# Patient Record
Sex: FEMALE | Race: WHITE | HISPANIC OR LATINO | Employment: OTHER | URBAN - METROPOLITAN AREA
[De-identification: names, ages, dates, MRNs, and addresses within clinical notes are randomized per-mention and may not be internally consistent; named-entity substitution may affect disease eponyms.]

---

## 2017-01-17 ENCOUNTER — TELEPHONE (OUTPATIENT)
Dept: NEUROLOGY | Facility: CLINIC | Age: 37
End: 2017-01-17

## 2017-01-17 NOTE — TELEPHONE ENCOUNTER
----- Message from Marc Tyler sent at 1/17/2017  2:57 PM CST -----  Contact: Self [phone]  Patient is calling to find out if a appt is available later in the day, pls call

## 2017-04-27 ENCOUNTER — OFFICE VISIT (OUTPATIENT)
Dept: NEUROLOGY | Facility: CLINIC | Age: 37
End: 2017-04-27
Payer: MEDICARE

## 2017-04-27 ENCOUNTER — LAB VISIT (OUTPATIENT)
Dept: LAB | Facility: HOSPITAL | Age: 37
End: 2017-04-27
Attending: PSYCHIATRY & NEUROLOGY
Payer: MEDICARE

## 2017-04-27 VITALS
DIASTOLIC BLOOD PRESSURE: 83 MMHG | WEIGHT: 119.06 LBS | HEART RATE: 105 BPM | HEIGHT: 59 IN | SYSTOLIC BLOOD PRESSURE: 120 MMHG | BODY MASS INDEX: 24 KG/M2

## 2017-04-27 DIAGNOSIS — F31.10 BIPOLAR AFFECTIVE DISORDER, CURRENT EPISODE MANIC WITHOUT PSYCHOTIC SYMPTOMS: Chronic | ICD-10-CM

## 2017-04-27 DIAGNOSIS — G25.9 MOVEMENT DISORDER: Primary | Chronic | ICD-10-CM

## 2017-04-27 DIAGNOSIS — G20.A1 PARKINSON'S DISEASE: ICD-10-CM

## 2017-04-27 DIAGNOSIS — M13.0 POLYARTHRITIS: ICD-10-CM

## 2017-04-27 DIAGNOSIS — G25.9 MOVEMENT DISORDER: Chronic | ICD-10-CM

## 2017-04-27 LAB
CERULOPLASMIN SERPL-MCNC: 28 MG/DL
LITHIUM SERPL-SCNC: 0.6 MMOL/L
TSH SERPL DL<=0.005 MIU/L-ACNC: 1.35 UIU/ML

## 2017-04-27 PROCEDURE — 82164 ANGIOTENSIN I ENZYME TEST: CPT

## 2017-04-27 PROCEDURE — 36415 COLL VENOUS BLD VENIPUNCTURE: CPT

## 2017-04-27 PROCEDURE — 1160F RVW MEDS BY RX/DR IN RCRD: CPT | Mod: S$GLB,,, | Performed by: PSYCHIATRY & NEUROLOGY

## 2017-04-27 PROCEDURE — 99204 OFFICE O/P NEW MOD 45 MIN: CPT | Mod: S$GLB,,, | Performed by: PSYCHIATRY & NEUROLOGY

## 2017-04-27 PROCEDURE — 86341 ISLET CELL ANTIBODY: CPT

## 2017-04-27 PROCEDURE — 82390 ASSAY OF CERULOPLASMIN: CPT

## 2017-04-27 PROCEDURE — 84443 ASSAY THYROID STIM HORMONE: CPT

## 2017-04-27 PROCEDURE — 80178 ASSAY OF LITHIUM: CPT

## 2017-04-27 PROCEDURE — 86038 ANTINUCLEAR ANTIBODIES: CPT

## 2017-04-27 PROCEDURE — 99999 PR PBB SHADOW E&M-EST. PATIENT-LVL III: CPT | Mod: PBBFAC,,, | Performed by: PSYCHIATRY & NEUROLOGY

## 2017-04-27 RX ORDER — TRAZODONE HYDROCHLORIDE 100 MG/1
100 TABLET ORAL NIGHTLY
COMMUNITY

## 2017-04-27 RX ORDER — ALPRAZOLAM 1 MG/1
1 TABLET ORAL 3 TIMES DAILY PRN
COMMUNITY
End: 2020-12-07

## 2017-04-27 RX ORDER — CARBIDOPA AND LEVODOPA 50; 200 MG/1; MG/1
50-200 TABLET, EXTENDED RELEASE ORAL 3 TIMES DAILY
COMMUNITY
Start: 2017-04-14

## 2017-04-27 RX ORDER — LITHIUM CARBONATE 150 MG/1
350 CAPSULE ORAL DAILY
COMMUNITY
Start: 2017-04-26

## 2017-04-27 RX ORDER — TIZANIDINE 4 MG/1
4 TABLET ORAL 2 TIMES DAILY
COMMUNITY

## 2017-04-27 RX ORDER — ZOLPIDEM TARTRATE 10 MG/1
10 TABLET ORAL NIGHTLY
COMMUNITY
Start: 2017-04-26

## 2017-04-27 RX ORDER — DULOXETIN HYDROCHLORIDE 30 MG/1
30 CAPSULE, DELAYED RELEASE ORAL DAILY
COMMUNITY
Start: 2017-04-26

## 2017-04-27 RX ORDER — BACLOFEN 10 MG/1
10 TABLET ORAL DAILY
COMMUNITY

## 2017-04-27 NOTE — ASSESSMENT & PLAN NOTE
"Exam was challenging today as she had hyperkinetic movements of upper body and marked dystonia of bilateral legs.  Dystonia component consistent with young onset PD but variable dyskinetic movements concerning for alternate etiology such as SCA2, Wilber's, Fahrs, Gina's (or just levodopa effect).  Has clear "on" and "off" time, as reported by she and partner.   -> Check lab workup as below   -> Return to clinic for off/On exam   -> Pt to bring CD of brain MRI for review next visit     "

## 2017-04-27 NOTE — MR AVS SNAPSHOT
[facility]  [address]  [facility] [address]  Phone: [phone]  Fax: [phone]                  Constance Alejandro   2017 3:00 PM   Office Visit    Description:  Female : 1980   Provider:  Jessi Winkler MD   Department:  [facility]           Diagnoses this Visit        Comments    Movement disorder    -  Primary     Bipolar affective disorder, current episode manic without psychotic symptoms         Polyarthritis                To Do List           Goals (5 Years of Data)     None      Follow-Up and Disposition     Return in 5 weeks (on 2017) for at 11:20am.      [facility]     [facility] -  Assistance  Unless otherwise directed by your provider, please contact [facility], our nurse care line that is available for  assistance.     Registered nurses in the [facility] provide: appointment scheduling, clinical advisement, health education, and other advisory services.  Call: [phone] (toll free)               Medications           Message regarding Medications     Verify the changes and/or additions to your medication regime listed below are the same as discussed with your clinician today.  If any of these changes or additions are incorrect, please notify your healthcare provider.             Verify that the below list of medications is an accurate representation of the medications you are currently taking.  If none reported, the list may be blank. If incorrect, please contact your healthcare provider. Carry this list with you in case of emergency.           Current Medications     alprazolam (XANAX) 1 MG tablet Take 1 mg by mouth 3 (three) times daily as needed for Anxiety.    baclofen (LIORESAL) 10 MG tablet Take 10 mg by mouth once daily.    carbidopa-levodopa  mg (SINEMET CR)  mg TbSR Take  tablets by mouth 3 (three) times daily.    duloxetine (CYMBALTA) 30 MG capsule Take  "30 mg by mouth once daily.    lithium carbonate 150 MG capsule Take 350 mg by mouth once daily.    tizanidine (ZANAFLEX) 4 MG tablet Take 4 mg by mouth 2 (two) times daily.    trazodone (DESYREL) 100 MG tablet Take 100 mg by mouth every evening. 2 tablets at night    zolpidem (AMBIEN) 10 mg Tab Take 10 mg by mouth every evening.           Clinical Reference Information           Your Vitals Were     BP Pulse Height Weight BMI    120/83 105 4' 11" (1.499 m) 54 kg (119 lb 0.8 oz) 24.04 kg/m2      Blood Pressure          Most Recent Value    BP  120/83      Allergies as of 4/27/2017     No Known Allergies      Immunizations Administered on Date of Encounter - 4/27/2017     None      Orders Placed During Today's Visit     Future Labs/Procedures Expected by Expires    LAVELLE  4/27/2017 6/26/2018    ANGIOTENSIN CONVERTING ENZYME  4/27/2017 6/26/2018    CERULOPLASMIN  4/27/2017 6/26/2018    GLUTAMIC ACID DECARBOXYLASE  4/27/2017 6/26/2018    LITHIUM LEVEL  4/27/2017 6/26/2018    TSH  4/27/2017 6/26/2018      [facility] Sign-Up     Activating your [facility] account is as easy as 1-2-3!     1) Visit [email or web address], select Sign Up Now, enter this activation code and your date of birth, then select Next.  [email or web address]  Expires: 6/11/2017  4:46 PM      2) Create a username and password to use when you visit [facility] in the future and select a security question in case you lose your password and select Next.    3) Enter your e-mail address and click Sign Up!    Additional Information  If you have questions, please e-mail [email or web address] or call [phone] to talk to our [facility] staff. Remember, [facility] is NOT to be used for urgent needs. For medical emergencies, dial 911.         Instructions    1.  Please get the imaging of your brain (MRI) from [facility] in [place].  I need the disc to review (not just report).    2.  When you return 5/31, please do not take your sinemet that morning!  " Bring it with you as you'll take it in the clinic.  You can take everything else that morning that you normally take.       Smoking Cessation     If you would like to quit smoking:   You may be eligible for free services if you are a [place] resident and started smoking cigarettes before September 1, 1988.  Call the Smoking Cessation Trust (SCT) toll free at (946) 161-8238 or (105) 214-2491.   Call 1-800-QUIT-NOW if you do not meet the above criteria.   Contact us via email: [email or web address]   View our website for more information: [email or web address]        Language Assistance Services     ATTENTION: Language assistance services are available, free of charge. Please call [phone].      ATENCIÓN: Si lanala anjel, tiene a snowden disposición servicios gratuitos de asistencia lingüística. Llame al [phone].     CHÚ Ý: N?u b?n nói Ti?ng Vi?t, có các d?ch v? h? tr? ngôn ng? mi?n phí dành cho b?n. G?i s? [phone].         [facility] complies with applicable Federal civil rights laws and does not discriminate on the basis of race, color, national origin, age, disability, or sex.

## 2017-04-27 NOTE — PATIENT INSTRUCTIONS
1.  Please get the imaging of your brain (MRI) from [facility] in [place].  I need the disc to review (not just report).    2.  When you return 5/31, please do not take your sinemet that morning!  Bring it with you as you'll take it in the clinic.  You can take everything else that morning that you normally take.

## 2017-04-27 NOTE — PROGRESS NOTES
Constance SERRA Chief Complaints during this visit:  New Patient visit for  Evaluation of PD  HALINA Monson MD  [address] [facility] [address] [place] [address]    Primary Care Physician  Sherri Monson MD  [address] [facility] [address]        History of present illness:  36 y.o.  female seen in consultation at the request of  Dr. Carvalho for evaluation of a movement disorder.   Accompanied by her partner of over 20 years, Chad.  [place], [place]5 [place] away). She was referred here by her neurologist for Movement specialist evaluation.  [facility] would not take [employer].    Pt states 6 years ago, she lost 30 pounds, had significant muscle atrophy and difficulty walking. She had significant pain in her hips and legs. She was in a walker for most of the last 6 years with narcotic pain medications. She states she had multiple tests without positive results. She started going to an outside neurologist due to tremor, and she later wanted to wean narcotics to try and find out what was going on. After she had been off of them for several months, she began having severe symptoms, including dyskinesia, waist and neck dystonia, also in the feet, stuttering. Couldn't drive, cook or take care of children. Dr. Monson's nurse started her on mirapex which helped her symptoms It was determined that she had early onset Parkinson's disease.     She also has occular dystonia as well as a resting tremor, mostly on the left side. She also has trouble walking. She has graduated from her walker now for 10 months now. She has trouble with attention and forgetfulness as well.     She is currently off of Mirapex.    Alprazolam 1mg three times daily   Tizanidine 4mg two times per day - in the morning and at night  Trazadone 200mg at night  Baclofen 10mg once per day   Carbidopa/levodopa 50/200mg three times per day, she has  "been on this since last summer.    Docusate 100mg   Melatonin 10mg every night     Pt states that she can drive now, after taking the CD/LD, helps with dyskinesia, and dystonia but not the dystonia in her feet. She states she gets constipation from it and acne. Lasts 6-7 hours sometimes other times barely 4.  Chad says he notices she gets more "glitchy" when she wears off and that on she can at times appear almost normal.     She also has poor appetite.     Dyskinesia she describes as jumping around type movements on the left side of her body.     Took CD/LD last at 1:00 today     II.  Review of systems    Hyposmia (HENT)?sometimes doesnt smell at all, other times smells things okay or smells someting that  isnt there  RBD/sleep issues (Constitutional)?Has vivid dreams but does not act them out   Depression/anxiety (Psychiatric)?PT with bipolar disorder   Fatigue (Constitutional)?Yes  Constipation (GI)?Yes - chronic, goes once or twice per week   Urinary issues ()?No  Sexual dysfunction ()?N/A  Orthostasis (Cardiovascular)?No  Leg swelling (Cardiovascular)? No  Falls (Musculoskeletal)?Yes, last fall was couple weeks ago - has some difficulty coordinating steps   Cognitive impairment (Neurologic)?some, attention and remembering routines such as eating   Psychoses (Psychiatric)?No  Pain/Paresthesia (Neurologic)?Yes  - feet and legs   Visual changes (Eyes)?Yes - better since she started levodopa   Moles / skin changes (Skin)?Acne   Stridor / SOB (Pulm)?Yes - only happens when she doesn't take Xannax   Bruising (Heme)?Yes      III.  Past Medical History:   Diagnosis Date    Bipolar affective disorder, current episode manic without psychotic symptoms 4/27/2009    - Previously followed by Dr. Parrish out of [place], now without a psychiatrist     Movement disorder 4/27/2017    Polyarthritis 4/27/2017     Family History   Problem Relation Age of Onset    Hypertension Mother     Depression Mother     " "Diabetes Father      s/p renal transplant     Hypertension Father     Depression Father     Alzheimer's disease Maternal Grandmother         Mother - has significant mood changes  Both parents with depression     Brother 34 - ptsd    Sister, 40 no medical problems, possibly anorexic      Son - 10 years old with anxiety - pt suspicion with bipolar disorder as well    Maternal grandmother had Alzheimer's     Social History     Social History    Marital status: Single     Spouse name: N/A    Number of children: N/A    Years of education: N/A     Social History Main Topics    Smoking status: Current Every Day Smoker     Types: Cigarettes    Smokeless tobacco: None      Comment: just under 1/2 pack a day    Alcohol use Yes      Comment: very rarely     Drug use: Yes    Sexual activity: Not Asked     Other Topics Concern    None     Social History Narrative    None     Alprazolam 1mg three times daily   Tizanidine 4mg two times per day - in the morning and at night  Trazadone 200mg at night  Baclofen 10mg once per day   Carbidopa/levodopa 50/200mg three times per day, she has been on this since last summer.    Docusate 100mg   Melatonin 10mg every night  Lithium 450mg daily  Zolpidem 10mg QHS   Duloxetine 30mg daily     PRIOR problem-specific medications tried:      Carbidopa/levodopa - still on   Mirapex: started levodopa in place     Review of patient's allergies indicates:  No Known Allergies    IV. Physical Exam    Vitals:    04/27/17 1513   BP: 120/83   Pulse: 105   Weight: 54 kg (119 lb 0.8 oz)   Height: 4' 11" (1.499 m)     General appearance: Well nourished, well developed, no acute distress.     HEENT: NC/AT, perioral acneiform rash, MMM, normal pharynx          -------------------------------------------------------------  Facial Expression: normal       Affect: full       Orientation to time & place:  Oriented to time, place, person and situation       Attention & concentration:  Normal attention " "span and concentration WORLD backwards: DROW       Language: Spontaneous, fluent; able to repeat and name objects        Fund of knowledge:  Aware of current events        Speech:  mildy speech dysfluency, no dysarthria   -------------------------------------------------------  Cranial nerves: pupils equal round and reactive, extraocular movements intact, infrequently blinks to initiate saccades facial sensation intact, face symmetrical, hearing intact to whisper, palate raises midline, shoulder shrug strength normal, tongue protrudes midline.        -------------------------------------------------------  Musculoskeletal  Muscle tone: Increased in neck and upper extremities         Muscle Bulk: all 4 extremities normal        Muscle strength:  5/5 in all 4 extremities        Sensation: Intact to light touch, pin prick, vibration in all extremities        Deep tendon Reflexes: 3+ bilateral biceps, triceps, patella and ankles, + Jaw jerk      --------------------------------------------------------------  Cerebellar and Coordination  Gait:  Spastic, toe walks with reduced arm swing       Finger-nose: no dysmetria       Rapid Alternating Movements (pronation/supination):  Apraxic  --------------------------------------------------------------  MOVEMENT DISORDERS FOCUSED EXAM  Abnormality of movement (bradykinesia, hyperkinesia) present? Yes    No motor imperisistence   Tremor present?   Yes   Posture:  normal    UPDRS Motor Examination      Speech  0 - Normal.   Facial Expression  1 - Minimal Hypomimia, could be normal "Poker Face".   Rigidity     Neck 2 - Mild or moderate.   Upper Extremity: Right 3 - Marked, but full range of motion easily achieved.   Upper Extremity: Left 3 - Marked, but full range of motion easily achieved.   Lower Extremity: Right 0 - Absent.   Lower Extremity: Left 0 - Absent.     Finger Taps      right 2 - Moderately impaired. Definite and early fatiguing. May have occasional arrests in " movement.   left 2 - Moderately impaired. Definite and early fatiguing. May have occasional arrests in movement.   Hand Movements      right 2 - Moderately impaired. Definite and early fatiguing. May have occasional arrests in movement.   left 2 - Moderately impaired. Definite and early fatiguing. May have occasional arrests in movement.   Pronation/supination of Hands      right 3 - Severely impaired. Frequent hesitation in initiating movements or arrests in ongoing movement.   left 3 - Severely impaired. Frequent hesitation in initiating movements or arrests in ongoing movement.     Toe Tapping    right 1 - Mild slowing and/or reduction in amplitude.   left 2 - Moderately impaired. Definite and early fatiguing. May have occasional arrests in movement.     Leg Agility      right 2 - Moderately impaired. Definite and early fatiguing. May have occasional arrests in movement.   left 2 - Moderately impaired. Definite and early fatiguing. May have occasional arrests in movement.   Arising from Chair  1 - Slow; or may need more than one attempt.   Posture  0 - Normal erect.   Gait  1 - Walks slowly, may shuffle with short steps, but no festination (hastening steps) or propulsion.   Freezing of gait 0   Postural Stability (Response to sudden, strong posterior displacement produced by pull on shoulders while patient erect with eyes open and feet slightly apart.   Deferred    Body Bradykinesia and Hypokinesia (Combining slowness, hesitancy, decreased armswing, small amplitude, and poverty of movement in general)  2 - Mild degree of slowness and poverty of movement which is definitely abnormal.     Tremor at Rest:      Face, lips, chin 0 - Absent.    Hands:      right 0 - Absent.    left 0 - Absent.    Feet:     right 0 - Absent.    left 0 - Absent.    Constancy of REST tremor: 0   Postural tremor:    right 1 - Slight and infrequently present.    left 1 - Slight and infrequently present.    Kinetic tremor:    right 1 -  "Slight and infrequently present.    left 1 - Slight and infrequently present.    Dyskinesias present? Dyskinetic, behavioral rocking with fast tic like jerking        Total Motor UPDRS:  38        V.  Laboratory/ Radiological Data:   No hard images to review       Outside records reviewed.  Mri 3/2016 normal    VI.     Problem List Items Addressed This Visit        1 - High    Parkinson's disease - Primary    Overview     YOPD diagnosed by outside neurologist, predominately lower body dystonia, responsive to levodopa.         Current Assessment & Plan     Exam was challenging today as she had hyperkinetic movements of upper body and marked dystonia of bilateral legs.  Dystonia component consistent with young onset PD but variable dyskinetic movements concerning for alternate etiology such as SCA2, Wilber's, Fahrs, Wallace's (or just levodopa effect).  Has clear "on" and "off" time, as reported by she and partner.   -> Check lab workup as below   -> Return to clinic for off/On exam   -> Pt to bring CD of brain MRI for review next visit               2     Bipolar affective disorder, current episode manic without psychotic symptoms (Chronic)    Overview     - Previously followed by Dr. Parrish out of [place], now without a psychiatrist          Relevant Orders    LITHIUM LEVEL       3     Polyarthritis    Overview     Followed by primary care physcician                         Tests ordered during this visit:   Orders Placed This Encounter   Procedures    LITHIUM LEVEL    CERULOPLASMIN    LAVELLE    TSH    ANGIOTENSIN CONVERTING ENZYME    GLUTAMIC ACID DECARBOXYLASE         I appreciate the opportunity to participate in the care of this patient and will communicate my assessment and plan back to the referring physician via copy of this note.                             Return in 5 weeks (on 5/31/2017) for at 11:20am.     Pt interviewed and examined with Dr. Cathi Marie MD  Movement " Disorders Fellow  [facility]      See Fellow's note.  We saw the patient together, I examined individually, and we discussed the assessment and plan as she documented (and I edited) in her note.

## 2017-04-27 NOTE — LETTER
April 27, 2017      HALINA Monson MD  [address] [facility] [address]           [facility]  [address]  [facility] [address]  Phone: [phone]  Fax: [phone]          Patient: Constance Alejandro   MR Number: [id number]   YOB: 1980   Date of Visit: 4/27/2017       Dear Dr. HALINA Monson:    Thank you for referring Constance Alejandro to me for evaluation. Attached you will find relevant portions of my assessment and plan of care.    If you have questions, please do not hesitate to call me. I look forward to following Constance Alejandro along with you.    Sincerely,    Jessi Winkler MD    Enclosure  CC:  No Recipients    If you would like to receive this communication electronically, please contact [email or web address] or [phone] to request more information on [employer] Link access.    For providers and/or their staff who would like to refer a patient to [facility], please contact us through our one-stop-shop provider referral line, [facility], at [phone].    If you feel you have received this communication in error or would no longer like to receive these types of communications, please e-mail [email or web address]

## 2017-04-28 LAB
ACE SERPL-CCNC: 26 U/L
ANA SER QL IF: NORMAL

## 2017-04-30 LAB — GAD65 AB SER-SCNC: 0 NMOL/L

## 2017-05-31 ENCOUNTER — OFFICE VISIT (OUTPATIENT)
Dept: NEUROLOGY | Facility: CLINIC | Age: 37
End: 2017-05-31
Payer: MEDICARE

## 2017-05-31 VITALS
HEIGHT: 59 IN | DIASTOLIC BLOOD PRESSURE: 90 MMHG | SYSTOLIC BLOOD PRESSURE: 135 MMHG | BODY MASS INDEX: 23.56 KG/M2 | WEIGHT: 116.88 LBS | HEART RATE: 107 BPM

## 2017-05-31 DIAGNOSIS — G24.9 DYSTONIA: ICD-10-CM

## 2017-05-31 DIAGNOSIS — R25.9 ABNORMAL INVOLUNTARY MOVEMENT: ICD-10-CM

## 2017-05-31 PROCEDURE — 99214 OFFICE O/P EST MOD 30 MIN: CPT | Mod: S$GLB,,, | Performed by: PSYCHIATRY & NEUROLOGY

## 2017-05-31 PROCEDURE — 99999 PR PBB SHADOW E&M-EST. PATIENT-LVL III: CPT | Mod: PBBFAC,,, | Performed by: PSYCHIATRY & NEUROLOGY

## 2017-05-31 NOTE — PROGRESS NOTES
Constance SERRA Chief Complaints during this visit:  f/u Patient visit for PD  HALINA Monson MD  [address] [facility]  [place] [address]    Primary Care Physician  Sherri Monson MD  [address] [facility] [address]        History of present illness:  36 y.o.  W seen in f/u for possible PD, off meds today.  Accompanied by Chad.      Initial consult 4/27/17:  ...consultation at the request of  Dr. Carvalho for evaluation of a movement disorder.   Accompanied by her partner of over 20 years, Chad.  [place], [place]5 [place] away). She was referred here by her neurologist for Movement specialist evaluation.  [facility] would not take [employer].  Pt states 6 years ago, she lost 30 pounds, had significant muscle atrophy and difficulty walking. She had significant pain in her hips and legs. She was in a walker for most of the last 6 years with narcotic pain medications. She states she had multiple tests without positive results. She started going to an outside neurologist due to tremor, and she later wanted to wean narcotics to try and find out what was going on. After she had been off of them for several months, she began having severe symptoms, including dyskinesia, waist and neck dystonia, also in the feet, stuttering. Couldn't drive, cook or take care of children. Dr. Monson's nurse started her on mirapex which helped her symptoms It was determined that she had early onset Parkinson's disease.   She also has occular dystonia as well as a resting tremor, mostly on the left side. She also has trouble walking. She has graduated from her walker now for 10 months now. She has trouble with attention and forgetfulness as well.   She is currently off of Mirapex.  Alprazolam 1mg three times daily   Tizanidine 4mg two times per day - in the morning and at night  Trazadone 200mg at night  Baclofen 10mg once per  "day   Carbidopa/levodopa 50/200mg three times per day, she has been on this since last summer.    Docusate 100mg   Melatonin 10mg every night   Pt states that she can drive now, after taking the CD/LD, helps with dyskinesia, and dystonia but not the dystonia in her feet. She states she gets constipation from it and acne. Lasts 6-7 hours sometimes other times barely 4.  Chad says he notices she gets more "glitchy" when she wears off and that on she can at times appear almost normal.   She also has poor appetite.   Dyskinesia she describes as jumping around type movements on the left side of her body.   Took CD/LD last at 1:00 today     II.  Review of systems  As in HPI; balance 3 systems reviewed and are negative.      III.  Past Medical History:   Diagnosis Date    Bipolar affective disorder, current episode manic without psychotic symptoms 4/27/2009    - Previously followed by Dr. Parrish out of [place], now without a psychiatrist     Movement disorder 4/27/2017    Polyarthritis 4/27/2017     Family History   Problem Relation Age of Onset    Hypertension Mother     Depression Mother     Diabetes Father      s/p renal transplant     Hypertension Father     Depression Father     Alzheimer's disease Maternal Grandmother         Mother - has significant mood changes  Both parents with depression     Brother 34 - ptsd    Sister, 40 no medical problems, possibly anorexic      Son - 10 years old with anxiety - pt suspicion with bipolar disorder as well    Maternal grandmother had Alzheimer's     Social History     Social History    Marital status: Single     Spouse name: N/A    Number of children: N/A    Years of education: N/A     Social History Main Topics    Smoking status: Current Every Day Smoker     Types: Cigarettes    Smokeless tobacco: None      Comment: just under 1/2 pack a day    Alcohol use Yes      Comment: very rarely     Drug use:     Sexual activity: Not Asked     Other Topics " "Concern    None     Social History Narrative    None     Alprazolam 1mg three times daily   Tizanidine 4mg two times per day - in the morning and at night  Trazadone 200mg at night  Baclofen 10mg once per day   Carbidopa/levodopa 50/200mg three times per day, she has been on this since last summer.    Docusate 100mg   Melatonin 10mg every night  Lithium 450mg daily  Zolpidem 10mg QHS   Duloxetine 30mg daily     PRIOR problem-specific medications tried:      Carbidopa/levodopa - still on   Mirapex: started levodopa in place     Review of patient's allergies indicates:  No Known Allergies    IV. Physical Exam    Vitals:    05/31/17 1056   BP: (!) 135/90   Pulse: 107   Weight: 53 kg (116 lb 13.5 oz)   Height: 4' 11" (1.499 m)     General appearance: Well nourished, well developed, no acute distress.     HEENT: NC/AT, perioral acneiform rash, MMM, normal pharynx          -------------------------------------------------------------  Facial Expression: normal       Affect: full       Orientation to time & place:  Oriented to time, place, person and situation       Attention & concentration:  Normal attention span and concentration WORLD backwards: DROW       Language: Spontaneous, fluent; able to repeat and name objects        Fund of knowledge:  Aware of current events        Speech:  mildy speech dysfluency, no dysarthria   -------------------------------------------------------  Cranial nerves: pupils equal round and reactive, extraocular movements intact, infrequently blinks to initiate saccades when testing, but normal during interview, facial sensation intact, face symmetrical, hearing intact to whisper, palate raises midline, shoulder shrug strength normal, tongue protrudes midline.        -------------------------------------------------------  Musculoskeletal  Muscle tone: Increased in neck and upper extremities         Muscle Bulk: all 4 extremities normal        Muscle strength:  5/5 in all 4 extremities     "    Sensation: Intact to light touch in all extremities        Deep tendon Reflexes: 3+ bilateral biceps, triceps, patella and ankles    --------------------------------------------------------------  Cerebellar and Coordination  Gait:  Spastic, toe walks with reduced arm swing       Finger-nose: no dysmetria       Rapid Alternating Movements (pronation/supination):  Apraxic  --------------------------------------------------------------  MOVEMENT DISORDERS FOCUSED EXAM  Abnormality of movement (bradykinesia, hyperkinesia) present? Yes, dyskinetic, non-patterned movements of head, neck, shoulders, arms.  No motor imperisistence   Tremor present?   Yes, high frequency, low amplitude bilateral hands  Posture:  normal            V.  Laboratory/ Radiological Data:     Lab Visit on 04/27/2017   Component Date Value Ref Range Status    Lithium Lvl 04/27/2017 0.6  0.6 - 1.2 mmol/L Final    Ceruloplasmin 04/27/2017 28.0  15.0 - 45.0 mg/dL Final    LAVELLE Screen 04/28/2017 Negative <1:160  Negative <1:160 Final    TSH 04/27/2017 1.350  0.400 - 4.000 uIU/mL Final    Angio Convert Enzyme 04/28/2017 26  8 - 53 U/L Final    Comment: Test Performed by:  [facility] [address] [address]      Glutamic Acid Decarb Ab 04/30/2017 0.00  <=0.02 nmol/L Final    Comment: -------------------ADDITIONAL INFORMATION-------------------  This test was developed and its performance characteristics   determined by [facility] in a manner consistent with CLIA   requirements. This test has not been cleared or approved by   the U.S. Food and Drug Administration.  Test Performed by:  [address] [address]            Outside records reviewed.  Mri 3/2016 normal        VI.     Problem List Items Addressed This Visit        1 - High    Abnormal involuntary movement    Overview     Rocking of trunk, twisting of neck, not  patterned.         Current Assessment & Plan     She came today off of levodopa (>12 hours), so movements are not levodopa-induced dyskinesias.  Her movements today of upper body were most consistent with conversion disorder.  I suspect there may be multiple layers of pathology, including a dysregulation of dopamine centrally and, probably, other neurotransmitters.     -> I would like her psychologist to know that conversion disorder is one of her diagnosis, but patient declined.   -> ok to continue levodopa, though I would keep at lowest doses possible so she does not run into future problems.   -> I recommend getting Alok scan to evaluate for central dopamine pathology.              2     Dystonia    Overview     Predominately lower body dystonia, responsive to levodopa.         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           Other Visit Diagnoses    None.           No Follow-up on file.

## 2017-05-31 NOTE — LETTER
Ana Maria 3, 2017      HALINA Monson MD  [address] [facility] [address]           [facility]  [address]  [facility] [address]  Phone: [phone]  Fax: [phone]          Patient: Constance Alejandro   MR Number: [id number]   YOB: 1980   Date of Visit: 5/31/2017       Dear Dr. HALINA Monson:    Thank you for referring Constance Alejandro to me for evaluation. Attached you will find relevant portions of my assessment and plan of care.    If you have questions, please do not hesitate to call me. I look forward to following Constance Alejandro along with you.    Sincerely,        Enclosure  CC:  No Recipients    If you would like to receive this communication electronically, please contact [email or web address] or [phone] to request more information on [employer] Link access.    For providers and/or their staff who would like to refer a patient to [facility], please contact us through our one-stop-shop provider referral line, [facility] , at [phone].    If you feel you have received this communication in error or would no longer like to receive these types of communications, please e-mail [email or web address]

## 2017-05-31 NOTE — ASSESSMENT & PLAN NOTE
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

## 2017-05-31 NOTE — ASSESSMENT & PLAN NOTE
She came today off of levodopa (>12 hours), so movements are not levodopa-induced dyskinesias.  Her movements today of upper body were most consistent with conversion disorder.  I suspect there may be multiple layers of pathology, including a dysregulation of dopamine centrally and, probably, other neurotransmitters.     -> I would like her psychologist to know that conversion disorder is one of her diagnosis, but patient declined forwarding my note.   -> ok to continue levodopa, though I would keep at lowest doses possible so she does not run into future problems.   -> I recommend getting Alok scan to evaluate for central dopamine pathology.

## 2019-09-13 ENCOUNTER — OFFICE VISIT (OUTPATIENT)
Dept: FAMILY MEDICINE | Facility: CLINIC | Age: 39
End: 2019-09-13
Payer: MEDICARE

## 2019-09-13 VITALS
WEIGHT: 111.81 LBS | DIASTOLIC BLOOD PRESSURE: 92 MMHG | HEIGHT: 59 IN | SYSTOLIC BLOOD PRESSURE: 146 MMHG | RESPIRATION RATE: 16 BRPM | HEART RATE: 97 BPM | TEMPERATURE: 98 F | OXYGEN SATURATION: 98 % | BODY MASS INDEX: 22.54 KG/M2

## 2019-09-13 DIAGNOSIS — J06.9 UPPER RESPIRATORY TRACT INFECTION, UNSPECIFIED TYPE: Primary | ICD-10-CM

## 2019-09-13 DIAGNOSIS — R53.83 FATIGUE, UNSPECIFIED TYPE: ICD-10-CM

## 2019-09-13 PROCEDURE — 3008F PR BODY MASS INDEX (BMI) DOCUMENTED: ICD-10-PCS | Mod: CPTII,S$GLB,, | Performed by: NURSE PRACTITIONER

## 2019-09-13 PROCEDURE — 99213 OFFICE O/P EST LOW 20 MIN: CPT | Mod: 25,S$GLB,, | Performed by: NURSE PRACTITIONER

## 2019-09-13 PROCEDURE — 96372 THER/PROPH/DIAG INJ SC/IM: CPT | Mod: S$GLB,,, | Performed by: NURSE PRACTITIONER

## 2019-09-13 PROCEDURE — 96372 PR INJECTION,THERAP/PROPH/DIAG2ST, IM OR SUBCUT: ICD-10-PCS | Mod: S$GLB,,, | Performed by: NURSE PRACTITIONER

## 2019-09-13 PROCEDURE — 3008F BODY MASS INDEX DOCD: CPT | Mod: CPTII,S$GLB,, | Performed by: NURSE PRACTITIONER

## 2019-09-13 PROCEDURE — 99213 PR OFFICE/OUTPT VISIT, EST, LEVL III, 20-29 MIN: ICD-10-PCS | Mod: 25,S$GLB,, | Performed by: NURSE PRACTITIONER

## 2019-09-13 RX ORDER — LITHIUM CARBONATE 300 MG/1
2 CAPSULE ORAL DAILY
COMMUNITY

## 2019-09-13 RX ORDER — BROMPHENIRAMINE MALEATE, PSEUDOEPHEDRINE HYDROCHLORIDE, AND DEXTROMETHORPHAN HYDROBROMIDE 2; 30; 10 MG/5ML; MG/5ML; MG/5ML
10 SYRUP ORAL EVERY 4 HOURS PRN
Qty: 240 ML | Refills: 0 | Status: SHIPPED | OUTPATIENT
Start: 2019-09-13 | End: 2019-09-23

## 2019-09-13 RX ORDER — PROMETHAZINE HYDROCHLORIDE AND DEXTROMETHORPHAN HYDROBROMIDE 6.25; 15 MG/5ML; MG/5ML
5 SYRUP ORAL EVERY 4 HOURS PRN
Qty: 240 ML | Refills: 0 | Status: SHIPPED | OUTPATIENT
Start: 2019-09-13 | End: 2019-09-23

## 2019-09-13 RX ORDER — METHYLPREDNISOLONE 4 MG/1
TABLET ORAL
Qty: 21 TABLET | Refills: 0 | Status: SHIPPED | OUTPATIENT
Start: 2019-09-13 | End: 2020-12-07

## 2019-09-13 RX ORDER — CYANOCOBALAMIN 1000 UG/ML
100 INJECTION, SOLUTION INTRAMUSCULAR; SUBCUTANEOUS ONCE
Status: COMPLETED | OUTPATIENT
Start: 2019-09-13 | End: 2019-09-13

## 2019-09-13 RX ORDER — LEVOCETIRIZINE DIHYDROCHLORIDE 5 MG/1
5 TABLET, FILM COATED ORAL NIGHTLY
Qty: 14 TABLET | Refills: 0 | Status: SHIPPED | OUTPATIENT
Start: 2019-09-13 | End: 2020-12-07

## 2019-09-13 RX ADMIN — CYANOCOBALAMIN 100 MCG: 1000 INJECTION, SOLUTION INTRAMUSCULAR; SUBCUTANEOUS at 10:09

## 2019-09-13 NOTE — PROGRESS NOTES
Subjective:       Patient ID: Constance Alejandro is a 38 y.o. female.    Chief Complaint: Nasal Congestion (has been sick for 11 days. ); Cough (feels like she can not take a full breath); and Diarrhea (off and on. no fever. having trouble eating)    HPI     Cough/congestion x 12 days. Feels like she can't inhale deeply. Poor appetite w/ diarrhea. No fever that she is aware of. No pain in face or behind eyes. Pressure sensation in ears. No wheezing.     Review of Systems   Constitutional: Negative for activity change, appetite change, chills and fever.   HENT: Positive for congestion, postnasal drip and rhinorrhea. Negative for hearing loss, nosebleeds, sinus pressure, sore throat and trouble swallowing.    Respiratory: Positive for cough. Negative for chest tightness, shortness of breath and wheezing.    Cardiovascular: Negative for chest pain and palpitations.   Gastrointestinal: Negative for abdominal distention, abdominal pain, constipation, diarrhea, nausea and vomiting.   Genitourinary: Negative for difficulty urinating, flank pain, frequency, hematuria and urgency.   Musculoskeletal: Negative for arthralgias, back pain, joint swelling, neck pain and neck stiffness.   Skin: Negative for color change and pallor.   Neurological: Negative for dizziness, seizures, syncope, weakness, light-headedness and headaches.   Hematological: Negative for adenopathy. Does not bruise/bleed easily.   Psychiatric/Behavioral: Negative for agitation, behavioral problems, confusion and sleep disturbance. The patient is not nervous/anxious.        Objective:      Physical Exam   Constitutional: She is oriented to person, place, and time. She appears well-developed and well-nourished.   HENT:   Head: Normocephalic and atraumatic.   Right Ear: Hearing, tympanic membrane, external ear and ear canal normal.   Left Ear: Hearing, tympanic membrane, external ear and ear canal normal.   Nose: Mucosal edema and rhinorrhea present. Right sinus  exhibits no maxillary sinus tenderness and no frontal sinus tenderness. Left sinus exhibits no maxillary sinus tenderness and no frontal sinus tenderness.   Mouth/Throat: Mucous membranes are normal. Posterior oropharyngeal erythema present. No oropharyngeal exudate or posterior oropharyngeal edema.   Eyes: Pupils are equal, round, and reactive to light. Conjunctivae and EOM are normal. No scleral icterus.   Neck: Trachea normal and normal range of motion. Neck supple. Carotid bruit is not present. No thyroid mass present.   Cardiovascular: Normal rate, regular rhythm and normal heart sounds.   Pulmonary/Chest: Effort normal and breath sounds normal.   Abdominal: Soft. Bowel sounds are normal.   Musculoskeletal: Normal range of motion. She exhibits no edema.   Neurological: She is alert and oriented to person, place, and time.   Skin: Skin is warm and dry.   Psychiatric: She has a normal mood and affect. Her behavior is normal. Judgment normal.       Assessment:       1. Upper respiratory tract infection, unspecified type    2. Fatigue, unspecified type        Plan:       PROBLEM LIST     Constance was seen today for nasal congestion, cough and diarrhea.    Diagnoses and all orders for this visit:    Upper respiratory tract infection, unspecified type  -     methylPREDNISolone (MEDROL DOSEPACK) 4 mg tablet; use as directed  -     brompheniramine-pseudoeph-DM (BROMFED DM) 2-30-10 mg/5 mL Syrp; Take 10 mLs by mouth every 4 (four) hours as needed (cough/congestion).  -     levocetirizine (XYZAL) 5 MG tablet; Take 1 tablet (5 mg total) by mouth every evening.    Fatigue, unspecified type  -     cyanocobalamin injection 100 mcg    take medications as directed; increase oral hydration H2O. Call or RTC for persistent or worsening symptoms

## 2020-11-20 ENCOUNTER — OFFICE VISIT (OUTPATIENT)
Dept: FAMILY MEDICINE | Facility: CLINIC | Age: 40
End: 2020-11-20
Payer: MEDICARE

## 2020-11-20 VITALS
SYSTOLIC BLOOD PRESSURE: 127 MMHG | BODY MASS INDEX: 20.2 KG/M2 | WEIGHT: 100.19 LBS | HEIGHT: 59 IN | RESPIRATION RATE: 16 BRPM | HEART RATE: 95 BPM | TEMPERATURE: 98 F | DIASTOLIC BLOOD PRESSURE: 89 MMHG | OXYGEN SATURATION: 99 %

## 2020-11-20 DIAGNOSIS — J30.2 SEASONAL ALLERGIES: ICD-10-CM

## 2020-11-20 DIAGNOSIS — J32.9 CHRONIC SINUSITIS, UNSPECIFIED LOCATION: Primary | ICD-10-CM

## 2020-11-20 PROBLEM — J32.3 CHRONIC SPHENOIDAL SINUSITIS: Status: ACTIVE | Noted: 2020-11-20

## 2020-11-20 PROCEDURE — 3008F PR BODY MASS INDEX (BMI) DOCUMENTED: ICD-10-PCS | Mod: CPTII,S$GLB,, | Performed by: NURSE PRACTITIONER

## 2020-11-20 PROCEDURE — 99213 OFFICE O/P EST LOW 20 MIN: CPT | Mod: S$GLB,,, | Performed by: NURSE PRACTITIONER

## 2020-11-20 PROCEDURE — 3008F BODY MASS INDEX DOCD: CPT | Mod: CPTII,S$GLB,, | Performed by: NURSE PRACTITIONER

## 2020-11-20 PROCEDURE — 99213 PR OFFICE/OUTPT VISIT, EST, LEVL III, 20-29 MIN: ICD-10-PCS | Mod: S$GLB,,, | Performed by: NURSE PRACTITIONER

## 2020-11-20 RX ORDER — ALPRAZOLAM 0.5 MG/1
1 TABLET ORAL 4 TIMES DAILY
COMMUNITY
Start: 2020-11-10

## 2020-11-20 RX ORDER — TEMAZEPAM 30 MG/1
30 CAPSULE ORAL NIGHTLY
COMMUNITY
Start: 2020-11-01

## 2020-11-20 RX ORDER — LURASIDONE HYDROCHLORIDE 40 MG/1
1 TABLET, FILM COATED ORAL NIGHTLY
COMMUNITY
Start: 2020-11-06

## 2020-11-20 RX ORDER — MONTELUKAST SODIUM 10 MG/1
10 TABLET ORAL NIGHTLY
Qty: 30 TABLET | Refills: 5 | Status: SHIPPED | OUTPATIENT
Start: 2020-11-20

## 2020-11-20 RX ORDER — BUSPIRONE HYDROCHLORIDE 15 MG/1
15 TABLET ORAL 3 TIMES DAILY
COMMUNITY
Start: 2020-11-09

## 2020-11-20 NOTE — PROGRESS NOTES
Subjective:       Patient ID: Constance Alejandro is a 39 y.o. female.    Chief Complaint: Nasal Congestion (pt states that she has been having problems with stuffy nose but for the past couple of weeks it is completely closed where she cant breathe out of it. No other symptoms. )    HPI     Chronic severe nasal congestion and seasonal allergy complaints. She has been on Flonase, phenylephrine nasal spray, and allegra. Nasal congestion is getting worse. Very difficult to breath while sleeping at night.     Review of Systems   Constitutional: Negative for activity change, appetite change, chills and fever.   HENT: Positive for congestion, postnasal drip and rhinorrhea. Negative for hearing loss, nosebleeds, sinus pressure, sore throat and trouble swallowing.    Respiratory: Negative for cough, chest tightness, shortness of breath and wheezing.    Cardiovascular: Negative for chest pain and palpitations.   Gastrointestinal: Negative for vomiting.   Skin: Negative for color change and pallor.   Allergic/Immunologic: Positive for environmental allergies. Negative for food allergies and immunocompromised state.   Hematological: Negative for adenopathy. Does not bruise/bleed easily.   Psychiatric/Behavioral: Negative for decreased concentration and sleep disturbance. The patient is not nervous/anxious.            Past Medical History:  Past Medical History:   Diagnosis Date    Bipolar affective disorder, current episode manic without psychotic symptoms 2009    - Previously followed by Dr. Parrish out of [place], now without a psychiatrist     Movement disorder 2017    Parkinsons     Polyarthritis 2017      Past Surgical History:   Procedure Laterality Date     SECTION      DILATION AND CURETTAGE OF UTERUS      HYSTERECTOMY        Review of patient's allergies indicates:   Allergen Reactions    Tetanus vaccines and toxoid Anaphylaxis    Levofloxacin Nausea Only      Current Outpatient  Medications   Medication Sig Dispense Refill    ALPRAZolam (XANAX) 0.5 MG tablet Take 1 tablet by mouth 4 (four) times daily.      baclofen (LIORESAL) 10 MG tablet Take 10 mg by mouth once daily.      busPIRone (BUSPAR) 15 MG tablet Take 15 mg by mouth 3 (three) times daily.      carbidopa-levodopa  mg (SINEMET CR)  mg TbSR Take  tablets by mouth 3 (three) times daily.      LATUDA 40 mg Tab tablet Take 1 tablet by mouth nightly.      lithium (ESKALITH) 300 MG capsule Take 2 capsules by mouth once daily.      temazepam (RESTORIL) 30 mg capsule Take 30 mg by mouth nightly.      tizanidine (ZANAFLEX) 4 MG tablet Take 4 mg by mouth 2 (two) times daily.      trazodone (DESYREL) 100 MG tablet Take 100 mg by mouth every evening. 2 tablets at night      alprazolam (XANAX) 1 MG tablet Take 1 mg by mouth 3 (three) times daily as needed for Anxiety.      duloxetine (CYMBALTA) 30 MG capsule Take 30 mg by mouth once daily.      levocetirizine (XYZAL) 5 MG tablet Take 1 tablet (5 mg total) by mouth every evening. (Patient not taking: Reported on 11/20/2020) 14 tablet 0    lithium carbonate 150 MG capsule Take 350 mg by mouth once daily.      methylPREDNISolone (MEDROL DOSEPACK) 4 mg tablet use as directed (Patient not taking: Reported on 11/20/2020) 21 tablet 0    montelukast (SINGULAIR) 10 mg tablet Take 1 tablet (10 mg total) by mouth every evening. 30 tablet 5    ranitidine (ZANTAC) 150 MG tablet TAKE 1 TABLET BY MOUTH ONCE DAILY AT BEDTIME (Patient not taking: Reported on 11/20/2020) 30 tablet 2    zolpidem (AMBIEN) 10 mg Tab Take 10 mg by mouth every evening.       No current facility-administered medications for this visit.      Social History     Socioeconomic History    Marital status: Single     Spouse name: Not on file    Number of children: Not on file    Years of education: Not on file    Highest education level: Not on file   Occupational History    Not on file   Social Needs     Financial resource strain: Not on file    Food insecurity     Worry: Not on file     Inability: Not on file    Transportation needs     Medical: Not on file     Non-medical: Not on file   Tobacco Use    Smoking status: Current Every Day Smoker     Packs/day: 0.25     Types: Cigarettes    Smokeless tobacco: Never Used    Tobacco comment: just under 1/2 pack a day   Substance and Sexual Activity    Alcohol use: Never     Frequency: Never     Comment: very rarely     Drug use: Yes    Sexual activity: Not on file   Lifestyle    Physical activity     Days per week: Not on file     Minutes per session: Not on file    Stress: Not on file   Relationships    Social connections     Talks on phone: Not on file     Gets together: Not on file     Attends Zoroastrianism service: Not on file     Active member of club or organization: Not on file     Attends meetings of clubs or organizations: Not on file     Relationship status: Not on file   Other Topics Concern    Not on file   Social History Narrative    Not on file      Family History   Problem Relation Age of Onset    Hypertension Mother     Depression Mother     Diabetes Father         s/p renal transplant     Hypertension Father     Depression Father     Alzheimer's disease Maternal Grandmother         Objective:      Physical Exam  Constitutional:       Appearance: She is well-developed.   HENT:      Head: Normocephalic and atraumatic.      Right Ear: Hearing, tympanic membrane, ear canal and external ear normal.      Left Ear: Hearing, tympanic membrane, ear canal and external ear normal.      Nose: Mucosal edema and rhinorrhea present.      Right Sinus: No maxillary sinus tenderness or frontal sinus tenderness.      Left Sinus: No maxillary sinus tenderness or frontal sinus tenderness.      Mouth/Throat:      Pharynx: No oropharyngeal exudate or posterior oropharyngeal erythema.   Eyes:      General: No scleral icterus.     Conjunctiva/sclera:  Conjunctivae normal.      Pupils: Pupils are equal, round, and reactive to light.   Neck:      Thyroid: No thyroid mass.      Vascular: No carotid bruit.      Trachea: Trachea normal.   Cardiovascular:      Rate and Rhythm: Normal rate and regular rhythm.   Pulmonary:      Effort: Pulmonary effort is normal.      Breath sounds: Normal breath sounds.   Skin:     General: Skin is warm and dry.   Neurological:      Mental Status: She is alert.   Psychiatric:         Behavior: Behavior normal.         Judgment: Judgment normal.         Assessment:       1. Chronic sinusitis, unspecified location    2. Seasonal allergies        Plan:       PROBLEM LIST     Constance was seen today for nasal congestion.    Diagnoses and all orders for this visit:    Chronic sinusitis, unspecified location  -     montelukast (SINGULAIR) 10 mg tablet; Take 1 tablet (10 mg total) by mouth every evening.    Seasonal allergies    stop phenylephrine nasal spray. Start montelukast and fluticasone nasal. Use lissette pot w/ distilled water. Call or RTC for persistent or worsening symptoms.

## 2020-12-07 ENCOUNTER — OFFICE VISIT (OUTPATIENT)
Dept: FAMILY MEDICINE | Facility: CLINIC | Age: 40
End: 2020-12-07
Payer: MEDICARE

## 2020-12-07 VITALS
DIASTOLIC BLOOD PRESSURE: 57 MMHG | HEIGHT: 59 IN | OXYGEN SATURATION: 97 % | TEMPERATURE: 98 F | RESPIRATION RATE: 18 BRPM | SYSTOLIC BLOOD PRESSURE: 88 MMHG | BODY MASS INDEX: 19.88 KG/M2 | WEIGHT: 98.63 LBS | HEART RATE: 79 BPM

## 2020-12-07 DIAGNOSIS — J01.00 SUBACUTE MAXILLARY SINUSITIS: Primary | ICD-10-CM

## 2020-12-07 DIAGNOSIS — R51.9 PERSISTENT HEADACHES: ICD-10-CM

## 2020-12-07 DIAGNOSIS — R53.81 MALAISE: ICD-10-CM

## 2020-12-07 LAB
CTP QC/QA: YES
FLUAV AG NPH QL: NEGATIVE
FLUBV AG NPH QL: NEGATIVE

## 2020-12-07 PROCEDURE — 3008F BODY MASS INDEX DOCD: CPT | Mod: CPTII,S$GLB,, | Performed by: NURSE PRACTITIONER

## 2020-12-07 PROCEDURE — 87804 PR  DETECT AGENT,IMMUN,DIR OBS,INFLUENZA: ICD-10-PCS | Mod: QW,S$GLB,, | Performed by: NURSE PRACTITIONER

## 2020-12-07 PROCEDURE — 87804 INFLUENZA ASSAY W/OPTIC: CPT | Mod: QW,S$GLB,, | Performed by: NURSE PRACTITIONER

## 2020-12-07 PROCEDURE — 3008F PR BODY MASS INDEX (BMI) DOCUMENTED: ICD-10-PCS | Mod: CPTII,S$GLB,, | Performed by: NURSE PRACTITIONER

## 2020-12-07 PROCEDURE — 99213 OFFICE O/P EST LOW 20 MIN: CPT | Mod: 25,S$GLB,, | Performed by: NURSE PRACTITIONER

## 2020-12-07 PROCEDURE — 99213 PR OFFICE/OUTPT VISIT, EST, LEVL III, 20-29 MIN: ICD-10-PCS | Mod: 25,S$GLB,, | Performed by: NURSE PRACTITIONER

## 2020-12-07 PROCEDURE — 87804 INFLUENZA ASSAY W/OPTIC: CPT | Mod: 59,QW,, | Performed by: NURSE PRACTITIONER

## 2020-12-07 RX ORDER — AMOXICILLIN AND CLAVULANATE POTASSIUM 875; 125 MG/1; MG/1
1 TABLET, FILM COATED ORAL EVERY 12 HOURS
Qty: 20 TABLET | Refills: 0 | Status: SHIPPED | OUTPATIENT
Start: 2020-12-07

## 2020-12-07 RX ORDER — METHYLPREDNISOLONE 4 MG/1
TABLET ORAL
Qty: 21 TABLET | Refills: 0 | Status: SHIPPED | OUTPATIENT
Start: 2020-12-07

## 2020-12-07 NOTE — PROGRESS NOTES
Subjective:       Patient ID: Constance Alejandro is a 40 y.o. female.    Chief Complaint: Follow-up (pt still complains of congestion, pain in sinuses, and fatigue, has had some nose bleeds)    HPI     Severe sinus pain and severe nasal congestion. Some nose bleeds in recent weeks. She stopped taking the phenylephrine nasal spray as directed and just used the fluticasone and lissette pot rinse. She was only able to use the lissette pot twice because her nasal passages were so swollen that they blocked the passage of the distilled water. No improvement in severe congestion. Resorting to chronic mouth breathing. Worse when trying to sleep at night. Exquisitely tender on sides of nose and maxillary region bilaterally.     Review of Systems   Constitutional: Negative for activity change, appetite change, chills and fever.   HENT: Positive for congestion, postnasal drip, sinus pressure and sinus pain. Negative for ear discharge, ear pain, hearing loss, nosebleeds, rhinorrhea, sore throat and trouble swallowing.    Respiratory: Negative for cough, chest tightness, shortness of breath and wheezing.    Cardiovascular: Negative for chest pain and palpitations.   Gastrointestinal: Negative for vomiting.   Skin: Negative for color change and pallor.   Allergic/Immunologic: Positive for environmental allergies. Negative for food allergies and immunocompromised state.   Hematological: Negative for adenopathy. Does not bruise/bleed easily.   Psychiatric/Behavioral: Negative for decreased concentration and sleep disturbance. The patient is not nervous/anxious.            Past Medical History:  Past Medical History:   Diagnosis Date    Bipolar affective disorder, current episode manic without psychotic symptoms 4/27/2009    - Previously followed by Dr. Parrish out of [place], now without a psychiatrist     Movement disorder 4/27/2017    Parkinsons     Polyarthritis 4/27/2017      Past Surgical History:   Procedure Laterality  Date     SECTION      DILATION AND CURETTAGE OF UTERUS      HYSTERECTOMY        Review of patient's allergies indicates:   Allergen Reactions    Tetanus vaccines and toxoid Anaphylaxis    Levofloxacin Nausea Only      Current Outpatient Medications   Medication Sig Dispense Refill    ALPRAZolam (XANAX) 0.5 MG tablet Take 1 tablet by mouth 4 (four) times daily.      amoxicillin-clavulanate 875-125mg (AUGMENTIN) 875-125 mg per tablet Take 1 tablet by mouth every 12 (twelve) hours. 20 tablet 0    baclofen (LIORESAL) 10 MG tablet Take 10 mg by mouth once daily.      busPIRone (BUSPAR) 15 MG tablet Take 15 mg by mouth 3 (three) times daily.      carbidopa-levodopa  mg (SINEMET CR)  mg TbSR Take  tablets by mouth 3 (three) times daily.      duloxetine (CYMBALTA) 30 MG capsule Take 30 mg by mouth once daily.      LATUDA 40 mg Tab tablet Take 1 tablet by mouth nightly.      lithium (ESKALITH) 300 MG capsule Take 2 capsules by mouth once daily.      lithium carbonate 150 MG capsule Take 350 mg by mouth once daily.      methylPREDNISolone (MEDROL DOSEPACK) 4 mg tablet use as directed 21 tablet 0    montelukast (SINGULAIR) 10 mg tablet Take 1 tablet (10 mg total) by mouth every evening. 30 tablet 5    ranitidine (ZANTAC) 150 MG tablet TAKE 1 TABLET BY MOUTH ONCE DAILY AT BEDTIME (Patient not taking: Reported on 2020) 30 tablet 2    temazepam (RESTORIL) 30 mg capsule Take 30 mg by mouth nightly.      tizanidine (ZANAFLEX) 4 MG tablet Take 4 mg by mouth 2 (two) times daily.      trazodone (DESYREL) 100 MG tablet Take 100 mg by mouth every evening. 2 tablets at night      zolpidem (AMBIEN) 10 mg Tab Take 10 mg by mouth every evening.       No current facility-administered medications for this visit.      Social History     Socioeconomic History    Marital status: Single     Spouse name: Not on file    Number of children: Not on file    Years of education: Not on file     Highest education level: Not on file   Occupational History    Not on file   Social Needs    Financial resource strain: Not on file    Food insecurity     Worry: Not on file     Inability: Not on file    Transportation needs     Medical: Not on file     Non-medical: Not on file   Tobacco Use    Smoking status: Current Every Day Smoker     Packs/day: 0.25     Types: Cigarettes    Smokeless tobacco: Never Used    Tobacco comment: just under 1/2 pack a day   Substance and Sexual Activity    Alcohol use: Never     Frequency: Never     Comment: very rarely     Drug use: Yes    Sexual activity: Not on file   Lifestyle    Physical activity     Days per week: Not on file     Minutes per session: Not on file    Stress: Not on file   Relationships    Social connections     Talks on phone: Not on file     Gets together: Not on file     Attends Yarsanism service: Not on file     Active member of club or organization: Not on file     Attends meetings of clubs or organizations: Not on file     Relationship status: Not on file   Other Topics Concern    Not on file   Social History Narrative    Not on file      Family History   Problem Relation Age of Onset    Hypertension Mother     Depression Mother     Diabetes Father         s/p renal transplant     Hypertension Father     Depression Father     Alzheimer's disease Maternal Grandmother         Objective:      Physical Exam  Constitutional:       Appearance: She is well-developed.   HENT:      Head: Normocephalic and atraumatic.      Right Ear: Hearing, tympanic membrane, ear canal and external ear normal.      Left Ear: Hearing, tympanic membrane, ear canal and external ear normal.      Nose: Nasal tenderness, mucosal edema and congestion present. No rhinorrhea.      Right Sinus: Maxillary sinus tenderness present. No frontal sinus tenderness.      Left Sinus: Maxillary sinus tenderness present. No frontal sinus tenderness.      Mouth/Throat:      Pharynx:  No oropharyngeal exudate or posterior oropharyngeal erythema.   Eyes:      General: No scleral icterus.     Conjunctiva/sclera: Conjunctivae normal.      Pupils: Pupils are equal, round, and reactive to light.   Neck:      Thyroid: No thyroid mass.      Vascular: No carotid bruit.      Trachea: Trachea normal.   Cardiovascular:      Rate and Rhythm: Normal rate and regular rhythm.   Pulmonary:      Effort: Pulmonary effort is normal.      Breath sounds: Normal breath sounds.   Skin:     General: Skin is warm and dry.   Neurological:      Mental Status: She is alert.   Psychiatric:         Behavior: Behavior normal.         Judgment: Judgment normal.         Assessment:       1. Subacute maxillary sinusitis    2. Malaise    3. Persistent headaches        Plan:       PROBLEM LIST     Constance was seen today for follow-up.    Diagnoses and all orders for this visit:    Subacute maxillary sinusitis  -     amoxicillin-clavulanate 875-125mg (AUGMENTIN) 875-125 mg per tablet; Take 1 tablet by mouth every 12 (twelve) hours.  -     methylPREDNISolone (MEDROL DOSEPACK) 4 mg tablet; use as directed    Malaise  -     COVID-19 Routine Screening  -     POCT Influenza A/B    Persistent headaches  -     COVID-19 Routine Screening  -     POCT Influenza A/B      esquisitly tender sides of nose and maxillary regions, sinuses full. We attempted conservative approach at her last appt. Now abx is warranted. Start augmentin BID and medrol taper. Will obtain SARS-Co-2 swab and flu swab. Call or RTC for persistent or worsening symptoms.

## 2020-12-28 ENCOUNTER — OFFICE VISIT (OUTPATIENT)
Dept: FAMILY MEDICINE | Facility: CLINIC | Age: 40
End: 2020-12-28
Payer: MEDICARE

## 2020-12-28 VITALS
HEIGHT: 59 IN | HEART RATE: 76 BPM | BODY MASS INDEX: 19.63 KG/M2 | TEMPERATURE: 98 F | DIASTOLIC BLOOD PRESSURE: 72 MMHG | RESPIRATION RATE: 18 BRPM | SYSTOLIC BLOOD PRESSURE: 101 MMHG | OXYGEN SATURATION: 98 % | WEIGHT: 97.38 LBS

## 2020-12-28 DIAGNOSIS — R63.4 WEIGHT LOSS: Primary | ICD-10-CM

## 2020-12-28 DIAGNOSIS — Z12.31 ENCOUNTER FOR SCREENING MAMMOGRAM FOR MALIGNANT NEOPLASM OF BREAST: ICD-10-CM

## 2020-12-28 DIAGNOSIS — Z11.59 ENCOUNTER FOR SCREENING FOR OTHER VIRAL DISEASES: ICD-10-CM

## 2020-12-28 DIAGNOSIS — Z79.899 LONG TERM CURRENT USE OF THERAPEUTIC DRUG: ICD-10-CM

## 2020-12-28 PROCEDURE — 3008F PR BODY MASS INDEX (BMI) DOCUMENTED: ICD-10-PCS | Mod: CPTII,S$GLB,, | Performed by: NURSE PRACTITIONER

## 2020-12-28 PROCEDURE — 99214 PR OFFICE/OUTPT VISIT, EST, LEVL IV, 30-39 MIN: ICD-10-PCS | Mod: S$GLB,,, | Performed by: NURSE PRACTITIONER

## 2020-12-28 PROCEDURE — 99214 OFFICE O/P EST MOD 30 MIN: CPT | Mod: S$GLB,,, | Performed by: NURSE PRACTITIONER

## 2020-12-28 PROCEDURE — 3008F BODY MASS INDEX DOCD: CPT | Mod: CPTII,S$GLB,, | Performed by: NURSE PRACTITIONER

## 2020-12-28 NOTE — PROGRESS NOTES
Subjective:       Patient ID: Constance Alejandro is a 40 y.o. female.    Chief Complaint: Weight Loss (pt complains of weight loss. Pt reports her bp will drop and she feels very weak. )    HPI     States that she doesn't have an appetite. States that she has been struggling with appetite over the past year. No change in bowel patterns.      Per her account, her previous PCP Dr Mosher had tried her on multiple appetite stimulants in the past. Weight loss resulted in a hospitalization approx 3 yr ago. She states she lost 17 lb in the last 2 months. Our record validate 14 lb weight loss in the last 3 months.      She does have hx of bipolar disorder, but states that her appetite generally is not effected by it. He next appt with her mental health specialist is in 10 days.     Review of Systems   Constitutional: Positive for appetite change and unexpected weight change. Negative for activity change, chills and fever.   HENT: Negative for congestion, hearing loss, nosebleeds, postnasal drip, sinus pressure, sore throat and trouble swallowing.    Respiratory: Negative for cough, chest tightness, shortness of breath and wheezing.    Cardiovascular: Negative for chest pain and palpitations.   Gastrointestinal: Negative for abdominal distention, abdominal pain, constipation, diarrhea, nausea and vomiting.   Genitourinary: Negative for difficulty urinating, flank pain, frequency, hematuria and urgency.   Musculoskeletal: Negative for arthralgias, back pain, joint swelling, neck pain and neck stiffness.   Skin: Negative for color change and pallor.   Neurological: Negative for dizziness, seizures, syncope, weakness, light-headedness and headaches.   Hematological: Negative for adenopathy. Does not bruise/bleed easily.   Psychiatric/Behavioral: Negative for agitation, behavioral problems, confusion and sleep disturbance. The patient is not nervous/anxious.            Past Medical History:  Past Medical History:   Diagnosis Date     Bipolar affective disorder, current episode manic without psychotic symptoms 2009    - Previously followed by Dr. Parrish out of [place], now without a psychiatrist     Movement disorder 2017    Parkinsons     Polyarthritis 2017      Past Surgical History:   Procedure Laterality Date     SECTION      DILATION AND CURETTAGE OF UTERUS      HYSTERECTOMY        Review of patient's allergies indicates:   Allergen Reactions    Tetanus vaccines and toxoid Anaphylaxis    Levofloxacin Nausea Only      Current Outpatient Medications   Medication Sig Dispense Refill    ALPRAZolam (XANAX) 0.5 MG tablet Take 1 tablet by mouth 4 (four) times daily.      amoxicillin-clavulanate 875-125mg (AUGMENTIN) 875-125 mg per tablet Take 1 tablet by mouth every 12 (twelve) hours. 20 tablet 0    baclofen (LIORESAL) 10 MG tablet Take 10 mg by mouth once daily.      busPIRone (BUSPAR) 15 MG tablet Take 15 mg by mouth 3 (three) times daily.      carbidopa-levodopa  mg (SINEMET CR)  mg TbSR Take  tablets by mouth 3 (three) times daily.      duloxetine (CYMBALTA) 30 MG capsule Take 30 mg by mouth once daily.      LATUDA 40 mg Tab tablet Take 1 tablet by mouth nightly.      lithium (ESKALITH) 300 MG capsule Take 2 capsules by mouth once daily.      lithium carbonate 150 MG capsule Take 350 mg by mouth once daily.      methylPREDNISolone (MEDROL DOSEPACK) 4 mg tablet use as directed 21 tablet 0    montelukast (SINGULAIR) 10 mg tablet Take 1 tablet (10 mg total) by mouth every evening. 30 tablet 5    ranitidine (ZANTAC) 150 MG tablet TAKE 1 TABLET BY MOUTH ONCE DAILY AT BEDTIME (Patient not taking: Reported on 2020) 30 tablet 2    temazepam (RESTORIL) 30 mg capsule Take 30 mg by mouth nightly.      tizanidine (ZANAFLEX) 4 MG tablet Take 4 mg by mouth 2 (two) times daily.      trazodone (DESYREL) 100 MG tablet Take 100 mg by mouth every evening. 2 tablets at night       zolpidem (AMBIEN) 10 mg Tab Take 10 mg by mouth every evening.       No current facility-administered medications for this visit.      Social History     Socioeconomic History    Marital status: Single     Spouse name: Not on file    Number of children: Not on file    Years of education: Not on file    Highest education level: Not on file   Occupational History    Not on file   Social Needs    Financial resource strain: Not on file    Food insecurity     Worry: Not on file     Inability: Not on file    Transportation needs     Medical: Not on file     Non-medical: Not on file   Tobacco Use    Smoking status: Current Every Day Smoker     Packs/day: 0.25     Types: Cigarettes    Smokeless tobacco: Never Used    Tobacco comment: just under 1/2 pack a day   Substance and Sexual Activity    Alcohol use: Never     Frequency: Never     Comment: very rarely     Drug use: Yes    Sexual activity: Not on file   Lifestyle    Physical activity     Days per week: Not on file     Minutes per session: Not on file    Stress: Not on file   Relationships    Social connections     Talks on phone: Not on file     Gets together: Not on file     Attends Yazdanism service: Not on file     Active member of club or organization: Not on file     Attends meetings of clubs or organizations: Not on file     Relationship status: Not on file   Other Topics Concern    Not on file   Social History Narrative    Not on file      Family History   Problem Relation Age of Onset    Hypertension Mother     Depression Mother     Diabetes Father         s/p renal transplant     Hypertension Father     Depression Father     Alzheimer's disease Maternal Grandmother         Objective:      Physical Exam  Constitutional:       Appearance: She is well-developed and underweight.   HENT:      Head: Normocephalic and atraumatic.   Eyes:      General: No scleral icterus.     Conjunctiva/sclera: Conjunctivae normal.      Pupils: Pupils are  equal, round, and reactive to light.   Neck:      Musculoskeletal: Normal range of motion and neck supple.      Thyroid: No thyroid mass.      Vascular: No carotid bruit.      Trachea: Trachea normal.   Cardiovascular:      Rate and Rhythm: Normal rate and regular rhythm.      Heart sounds: Normal heart sounds.   Pulmonary:      Effort: Pulmonary effort is normal.      Breath sounds: Normal breath sounds.   Abdominal:      General: Bowel sounds are normal.      Palpations: Abdomen is soft.   Musculoskeletal: Normal range of motion.   Skin:     General: Skin is warm and dry.   Neurological:      Mental Status: She is alert and oriented to person, place, and time.   Psychiatric:         Behavior: Behavior normal.         Judgment: Judgment normal.         Assessment:       1. Weight loss    2. Encounter for screening for other viral diseases    3. Long term current use of therapeutic drug        Plan:       PROBLEM LIST     Constance was seen today for weight loss.    Diagnoses and all orders for this visit:    Weight loss  -     CBC Auto Differential; Future  -     Comprehensive Metabolic Panel; Future  -     Lipid Panel; Future  -     TSH and Free T4; Future  -     CBC Auto Differential  -     Comprehensive Metabolic Panel  -     Lipid Panel  -     TSH and Free T4    Encounter for screening for other viral diseases  -     Hepatitis C antibody; Future  -     HIV 1/2 Ag/Ab (4th Gen); Future  -     Hepatitis C antibody  -     HIV 1/2 Ag/Ab (4th Gen)    Long term current use of therapeutic drug  -     Lipid Panel; Future  -     Lipid Panel      Need to update health screens given unexpected weight loss--though this has happened in past resulting in hospitalization and was related to depression. Ordering annual fasting labs including TSH and HIV. RTC in 1 week for f/u. Need to send copy office notes and lab results to her psychiatrist. Will attempt appetite stimulant at f/u appt.